# Patient Record
Sex: MALE
[De-identification: names, ages, dates, MRNs, and addresses within clinical notes are randomized per-mention and may not be internally consistent; named-entity substitution may affect disease eponyms.]

---

## 2024-07-22 PROBLEM — Z00.00 ENCOUNTER FOR PREVENTIVE HEALTH EXAMINATION: Status: ACTIVE | Noted: 2024-07-22

## 2024-08-12 ENCOUNTER — APPOINTMENT (OUTPATIENT)
Dept: PULMONOLOGY | Facility: CLINIC | Age: 29
End: 2024-08-12
Payer: COMMERCIAL

## 2024-08-12 VITALS
DIASTOLIC BLOOD PRESSURE: 84 MMHG | HEART RATE: 81 BPM | HEIGHT: 66 IN | BODY MASS INDEX: 32.95 KG/M2 | SYSTOLIC BLOOD PRESSURE: 131 MMHG | OXYGEN SATURATION: 98 % | WEIGHT: 205 LBS

## 2024-08-12 VITALS — WEIGHT: 202 LBS | BODY MASS INDEX: 32.6 KG/M2

## 2024-08-12 DIAGNOSIS — R06.83 SNORING: ICD-10-CM

## 2024-08-12 DIAGNOSIS — G47.33 OBSTRUCTIVE SLEEP APNEA (ADULT) (PEDIATRIC): ICD-10-CM

## 2024-08-12 DIAGNOSIS — Z78.9 OTHER SPECIFIED HEALTH STATUS: ICD-10-CM

## 2024-08-12 PROCEDURE — 94729 DIFFUSING CAPACITY: CPT

## 2024-08-12 PROCEDURE — 99214 OFFICE O/P EST MOD 30 MIN: CPT | Mod: 25

## 2024-08-12 PROCEDURE — 94727 GAS DIL/WSHOT DETER LNG VOL: CPT

## 2024-08-12 PROCEDURE — 94010 BREATHING CAPACITY TEST: CPT

## 2024-08-12 PROCEDURE — ZZZZZ: CPT

## 2024-08-12 NOTE — HISTORY OF PRESENT ILLNESS
[Never] : never [TextBox_4] : FABIAN SANDY is a 29 year old male who presents with + watchpat  bedtime around midnight short sol snoring +  dry mouth  wake uparoun d9am not better rested if he sleeps more hours no dream recall  rare caffeine on sleep aides  dad wi th cande

## 2025-03-06 ENCOUNTER — NON-APPOINTMENT (OUTPATIENT)
Age: 30
End: 2025-03-06

## 2025-05-08 ENCOUNTER — TRANSCRIPTION ENCOUNTER (OUTPATIENT)
Age: 30
End: 2025-05-08